# Patient Record
Sex: MALE | Race: WHITE | NOT HISPANIC OR LATINO | Employment: OTHER | URBAN - METROPOLITAN AREA
[De-identification: names, ages, dates, MRNs, and addresses within clinical notes are randomized per-mention and may not be internally consistent; named-entity substitution may affect disease eponyms.]

---

## 2022-08-10 ENCOUNTER — OFFICE VISIT (OUTPATIENT)
Dept: URGENT CARE | Facility: CLINIC | Age: 42
End: 2022-08-10
Payer: MEDICAID

## 2022-08-10 VITALS
SYSTOLIC BLOOD PRESSURE: 141 MMHG | WEIGHT: 130 LBS | BODY MASS INDEX: 17.23 KG/M2 | DIASTOLIC BLOOD PRESSURE: 93 MMHG | RESPIRATION RATE: 16 BRPM | HEIGHT: 73 IN | TEMPERATURE: 99 F | HEART RATE: 80 BPM | OXYGEN SATURATION: 97 %

## 2022-08-10 DIAGNOSIS — G89.4 CHRONIC PAIN SYNDROME: ICD-10-CM

## 2022-08-10 PROBLEM — G89.29 CHRONIC PAIN: Status: ACTIVE | Noted: 2022-08-10

## 2022-08-10 PROCEDURE — 3080F PR MOST RECENT DIASTOLIC BLOOD PRESSURE >= 90 MM HG: ICD-10-PCS | Mod: CPTII,S$GLB,, | Performed by: INTERNAL MEDICINE

## 2022-08-10 PROCEDURE — 3077F PR MOST RECENT SYSTOLIC BLOOD PRESSURE >= 140 MM HG: ICD-10-PCS | Mod: CPTII,S$GLB,, | Performed by: INTERNAL MEDICINE

## 2022-08-10 PROCEDURE — 99213 OFFICE O/P EST LOW 20 MIN: CPT | Mod: S$GLB,,, | Performed by: INTERNAL MEDICINE

## 2022-08-10 PROCEDURE — 3080F DIAST BP >= 90 MM HG: CPT | Mod: CPTII,S$GLB,, | Performed by: INTERNAL MEDICINE

## 2022-08-10 PROCEDURE — 3008F PR BODY MASS INDEX (BMI) DOCUMENTED: ICD-10-PCS | Mod: CPTII,S$GLB,, | Performed by: INTERNAL MEDICINE

## 2022-08-10 PROCEDURE — 1159F MED LIST DOCD IN RCRD: CPT | Mod: CPTII,S$GLB,, | Performed by: INTERNAL MEDICINE

## 2022-08-10 PROCEDURE — 3077F SYST BP >= 140 MM HG: CPT | Mod: CPTII,S$GLB,, | Performed by: INTERNAL MEDICINE

## 2022-08-10 PROCEDURE — 99213 PR OFFICE/OUTPT VISIT, EST, LEVL III, 20-29 MIN: ICD-10-PCS | Mod: S$GLB,,, | Performed by: INTERNAL MEDICINE

## 2022-08-10 PROCEDURE — 1159F PR MEDICATION LIST DOCUMENTED IN MEDICAL RECORD: ICD-10-PCS | Mod: CPTII,S$GLB,, | Performed by: INTERNAL MEDICINE

## 2022-08-10 PROCEDURE — 3008F BODY MASS INDEX DOCD: CPT | Mod: CPTII,S$GLB,, | Performed by: INTERNAL MEDICINE

## 2022-08-10 NOTE — PATIENT INSTRUCTIONS
Continue current meds  Please have your scheduled test done tomorrow  Follow up your neurology    If your condition worsens we recommend that you receive another evaluation at the emergency room immediately or contact your primary medical clinics after hours call service to discuss your concerns. You must understand that you've received an Urgent Care treatment only and that you may be released before all of your medical problems are known or treated. You, the patient, will arrange for follow up care as instructed.  Drink plenty of Fluids  Wash hands frequently using mild antibacterial soap lathering for at least 15 seconds then rinse  Get plenty of Rest  Follow up in 1-2 weeks with Primary Care physician if not significantly better.   If you are not allergic please take Tylenol every 4-6 hours as needed and/or Ibuprofen every 6-8 hours as needed, over the counter for pain or fever.

## 2022-08-10 NOTE — PROGRESS NOTES
"Subjective:       Patient ID: Manav Wakefield is a 41 y.o. male.    Vitals:  height is 6' 1" (1.854 m) and weight is 59 kg (130 lb). His temperature is 98.6 °F (37 °C). His blood pressure is 141/93 (abnormal) and his pulse is 80. His respiration is 16 and oxygen saturation is 97%.     Chief Complaint: Foot Pain    Patient presents to urgent care today for right foot pain for approximately 10 days   Patient states he hurt the foot earlier in the year but has started to have more pain in the last few days   Patient has used heating pads with moderate relief.   Patient states this is a recurring problem over the last 6 months   Patient has been seen in the ER last week, was told there was nothing to do for his foot  Patient was given Flexeril at the ER for the foot pain       Foot Pain  This is a recurrent problem. The current episode started more than 1 month ago. The problem occurs constantly. The problem has been gradually worsening. Pertinent negatives include no abdominal pain, anorexia, arthralgias, change in bowel habit, chest pain, chills, congestion, coughing, diaphoresis, fatigue, fever, headaches, joint swelling, myalgias, nausea, neck pain, numbness, rash, sore throat, swollen glands, urinary symptoms, vertigo, visual change, vomiting or weakness. The symptoms are aggravated by walking. He has tried heat for the symptoms. The treatment provided moderate relief.       Constitution: Negative for chills, sweating, fatigue and fever.   HENT: Negative for congestion and sore throat.    Neck: Negative for neck pain.   Cardiovascular: Negative for chest pain.   Respiratory: Negative for cough.    Gastrointestinal: Negative for abdominal pain, nausea and vomiting.   Musculoskeletal: Negative for joint pain, joint swelling and muscle ache.   Skin: Negative for rash.   Neurological: Negative for history of vertigo, headaches and numbness.       Objective:      Physical Exam   Constitutional: He appears " well-developed. He is cooperative.   HENT:   Head: Normocephalic.   Eyes: Lids are normal. Pupils are equal, round, and reactive to light. vision grossly intact gaze aligned appropriately   Neck: Neck supple.   Pulmonary/Chest: Effort normal and breath sounds normal.   Musculoskeletal:         General: No swelling, tenderness, deformity or signs of injury.      Right lower leg: No edema.      Left lower leg: No edema.   Neurological: He is alert.   Skin: Skin is warm.   Psychiatric: His speech is normal. Judgment normal. His mood appears elated. He is inattentive.         Assessment:       1. Chronic pain syndrome          Plan:         Chronic pain syndrome      Patient Instructions   Continue current meds  Please have your scheduled test done tomorrow  Follow up your neurology    If your condition worsens we recommend that you receive another evaluation at the emergency room immediately or contact your primary medical clinics after hours call service to discuss your concerns. You must understand that you've received an Urgent Care treatment only and that you may be released before all of your medical problems are known or treated. You, the patient, will arrange for follow up care as instructed.  Drink plenty of Fluids  Wash hands frequently using mild antibacterial soap lathering for at least 15 seconds then rinse  Get plenty of Rest  Follow up in 1-2 weeks with Primary Care physician if not significantly better.   If you are not allergic please take Tylenol every 4-6 hours as needed and/or Ibuprofen every 6-8 hours as needed, over the counter for pain or fever.